# Patient Record
Sex: MALE | Race: WHITE | NOT HISPANIC OR LATINO | Employment: OTHER | URBAN - METROPOLITAN AREA
[De-identification: names, ages, dates, MRNs, and addresses within clinical notes are randomized per-mention and may not be internally consistent; named-entity substitution may affect disease eponyms.]

---

## 2018-04-09 ENCOUNTER — OFFICE VISIT (OUTPATIENT)
Dept: NEUROLOGY | Facility: CLINIC | Age: 68
End: 2018-04-09
Payer: MEDICARE

## 2018-04-09 VITALS
DIASTOLIC BLOOD PRESSURE: 66 MMHG | HEART RATE: 62 BPM | BODY MASS INDEX: 35.19 KG/M2 | WEIGHT: 218.94 LBS | SYSTOLIC BLOOD PRESSURE: 130 MMHG | HEIGHT: 66 IN

## 2018-04-09 DIAGNOSIS — G25.81 RESTLESS LEG SYNDROME, UNCONTROLLED: ICD-10-CM

## 2018-04-09 PROCEDURE — 99203 OFFICE O/P NEW LOW 30 MIN: CPT | Mod: PBBFAC | Performed by: PSYCHIATRY & NEUROLOGY

## 2018-04-09 PROCEDURE — 99204 OFFICE O/P NEW MOD 45 MIN: CPT | Mod: S$PBB,,, | Performed by: PSYCHIATRY & NEUROLOGY

## 2018-04-09 PROCEDURE — 99999 PR PBB SHADOW E&M-NEW PATIENT-LVL III: CPT | Mod: PBBFAC,,, | Performed by: PSYCHIATRY & NEUROLOGY

## 2018-04-09 RX ORDER — PREGABALIN 75 MG/1
75 CAPSULE ORAL 2 TIMES DAILY
COMMUNITY
End: 2018-05-07 | Stop reason: SDUPTHER

## 2018-04-09 RX ORDER — CLONAZEPAM 0.5 MG/1
0.5 TABLET ORAL
COMMUNITY
Start: 2018-03-07

## 2018-04-09 RX ORDER — POTASSIUM CHLORIDE 750 MG/1
10 TABLET, EXTENDED RELEASE ORAL ONCE
COMMUNITY

## 2018-04-09 RX ORDER — CEPHALEXIN 250 MG/1
250 CAPSULE ORAL 4 TIMES DAILY
COMMUNITY

## 2018-04-09 RX ORDER — VALSARTAN 160 MG/1
160 TABLET ORAL DAILY
COMMUNITY

## 2018-04-09 RX ORDER — BUDESONIDE AND FORMOTEROL FUMARATE DIHYDRATE 160; 4.5 UG/1; UG/1
2 AEROSOL RESPIRATORY (INHALATION) EVERY 12 HOURS
COMMUNITY

## 2018-04-09 RX ORDER — ROPINIROLE 1 MG/1
TABLET, FILM COATED ORAL
COMMUNITY
Start: 2018-03-26

## 2018-04-09 RX ORDER — ESCITALOPRAM OXALATE 20 MG/1
20 TABLET ORAL
COMMUNITY
Start: 2018-02-14 | End: 2018-04-09 | Stop reason: SINTOL

## 2018-04-09 RX ORDER — FUROSEMIDE 20 MG/1
20 TABLET ORAL 2 TIMES DAILY
COMMUNITY

## 2018-04-09 NOTE — PROGRESS NOTES
This is a 67-year-old right-handed patient who indicates that he has had difficulties with restless leg syndrome for a number of years.  The patient states that his symptoms are controlled to a certain extent by taking ropinirole 1 mg tablet, one half tablet up to 6 times a day averaging about every 4 hours together with Lyrica 75 mg twice a day and Klonopin 0.5 mg tablet, one half tablet at bedtime.  The patient states that during the day he is able to tolerate the discomfort as he is constantly moving and always busy.  However when he sits at the end of the day or attempts to go to sleep at night, his legs began to hurt to the point that he cannot stand to lay still and has to either move the feet or legs or get up and walk to relieve the discomfort.  Over the years, he has gradually increased his ropinirole to its current dose level.  However, he is also noted that occasionally he will be asymptomatic, remembering that it is time to take his medication, but then will have an exacerbation of his symptoms after taking the ropinirole.  He is further of the opinion that the addition of Lyrica has helped slightly during the daytime.    The patient is aware of the fact that he does have occasional episodes of overwhelming desire to sleep.  The patient's wife indicates that she has noticed this to occur also.  He is not had any other noticed side effects, specifically denying any of excessive compulsive gambling or other behaviors.    The patient is also noting that he is having some difficulty with recent memory.  The patient states that this is manifested as difficulty even in recalling names accurately.  He states that occasionally he will even forget the names of some of his children.  Patient states that he can misplace objects very easily although eventually he is able to find them.  He occasionally will have difficulty with recall of words during routine conversation.      ROS:  GENERAL: No fever, chills, or  weight loss.  SKIN: No rashes, itching or changes in color or texture of skin.  HEAD: No headaches or recent head trauma.  EYES: Visual acuity fine. No photophobia, ocular pain or diplopia.  EARS: Denies ear pain, discharge or vertigo.  NOSE: No loss of smell, no epistaxis or postnasal drip.  MOUTH & THROAT: No hoarseness or change in voice. No excessive gum bleeding.  NODES: Denies swollen glands.  CHEST: The patient reports easy dyspnea on exertion with minimal effort despite being on oxygen by nasal cannula.  He has no occasional nonproductive cough.  CARDIOVASCULAR: Denies chest pain, PND, orthopnea   ABDOMEN: Appetite fine. No weight loss. Denies diarrhea, abdominal pain, hematemesis or blood in stool.  URINARY: No flank pain, dysuria or hematuria.  PERIPHERAL VASCULAR: No claudication or cyanosis.  MUSCULOSKELETAL: No joint stiffness or swelling.   NEUROLOGIC: No history of seizures, paralysis, alteration of gait or coordination.    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  Both of his parents are .    SOCIAL HISTORY:  The patient is  and lives with his wife.  He is a former smoker.  He is retired.    PE:   VITAL SIGNS: Blood pressure 130/66, pulse 62, weight 99.3 kg, height 5 foot 6 inches, BMI 35.33  APPEARANCE: Well nourished, well developed, in no acute distress as he sits quietly in the exam room with nasal oxygen.    HEAD: Normocephalic, atraumatic.  EYES: PERRL. EOMI.  Non-icteric sclerae.    EARS: TM's intact. Light reflex normal. No retraction or perforation.    NOSE: Mucosa pink. Airway clear.  MOUTH & THROAT: No tonsillar enlargement. No pharyngeal erythema or exudate. No stridor.  NECK: Supple. No  bruits.  CHEST: Lungs clear to auscultation.  Breath sounds are somewhat distant however.  CARDIOVASCULAR: Regular rhythm without significant murmurs.  ABDOMEN: Bowel sounds normal. Not distended.  Moderate truncal obesity.  MUSCULOSKELETAL:  No bony deformity seen.  Muscle tone and muscle mass are normal in both upper and both lower extremities.  NEUROLOGIC:   Mental Status:  The patient is well oriented to person, time, place, and situation.  The patient is attentive to the environment and cooperative for the exam.  Cranial Nerves: II-XII grossly intact. Fundoscopic exam is normal.  No hemorrhage, exudate or papilledema is present. The extraocular muscles are intact in the cardinal directions of gaze.  No ptosis is present. Facial features are symmetrical.  Speech is normal in fluency, diction, and phrasing.  Tongue protrudes in the midline.    Gait and Station:  Romberg is negative.  Good alternate armswing with normal gait.  Motor:  No downdrift of either arm when held at shoulder level.  Manual muscle testing of proximal and distal muscles of both upper and lower extremities is normal.   Sensory:  Intact both upper and lower extremities to pin prick, touch, and vibration.  Cerebellar:  Finger to nose done well.  Alternating movements intact.  No involuntary movements or tremor seen.  Reflexes:  Stretch reflexes are 2+ both upper and lower extremities.  Plantar stimulation is flexor bilaterally and no pathological reflexes are seen    ASSESSMENT:  1.  Restless leg syndrome  2.  COPD  3.  Essential hypertension  4.  Obstructive sleep apnea    RECOMMENDATIONS:  1.  In this situation, care must be taken to avoid augmentation that can occur by increasing the doses of the dopamine agonist.  At the present time, I suspect he is getting maximum benefit from the ropinirole.  However a cautious trial of increasing the medication could be instituted if other measures are noneffective.  In the interval however, I would  recommend that he increase clonazepam to 0.5 mg at bedtime.  As an alternative, we could also increase Lyrica to 100 mg twice a day.  As mentioned, I would very hesitant to increase the dopamine agonist.  2.  The patient is going to try the increase clonazepam and then will notify this examiner so that we can make further recommendations in regards to medication management of his restless leg syndrome.  3.  Routine office visit in 6 months.    This was a 55 minute visit with the patient and his wife with over 50% of time spent counseling the patient regarding the management of restless leg syndrome with medication.      This note is generated with speech recognition software and is subject to transcription error and sound alike phrases that may be missed by proofreading.

## 2018-04-09 NOTE — LETTER
April 9, 2018      Roly Pizano MD  [address] [facility] [id number]           [facility]  [address] [facility]  [place] [address]  Phone: [phone]  Fax: [phone]          Patient: Marlene Lieberman Sr.   MR Number: [id number]   YOB: 1950   Date of Visit: 4/9/2018       Dear Dr. Roly Pizano:    Thank you for referring Marlene Lieberman to me for evaluation. Attached you will find relevant portions of my assessment and plan of care.    If you have questions, please do not hesitate to call me. I look forward to following Marlene Lieberman along with you.    Sincerely,    Henry Hook MD    Enclosure  CC:  No Recipients    If you would like to receive this communication electronically, please contact [email or web address] or [phone] to request more information on QuicklyChat Link access.    For providers and/or their staff who would like to refer a patient to [facility], please contact us through our one-stop-shop provider referral line, [facility], at [phone].    If you feel you have received this communication in error or would no longer like to receive these types of communications, please e-mail [email or web address]

## 2018-05-04 ENCOUNTER — PATIENT MESSAGE (OUTPATIENT)
Dept: NEUROLOGY | Facility: CLINIC | Age: 68
End: 2018-05-04

## 2018-05-07 ENCOUNTER — TELEPHONE (OUTPATIENT)
Dept: NEUROLOGY | Facility: CLINIC | Age: 68
End: 2018-05-07

## 2018-05-07 RX ORDER — PREGABALIN 75 MG/1
75 CAPSULE ORAL 2 TIMES DAILY
Qty: 60 CAPSULE | Refills: 5 | Status: SHIPPED | OUTPATIENT
Start: 2018-05-07

## 2018-05-07 NOTE — TELEPHONE ENCOUNTER
----- Message from Norma Espinoza sent at 5/7/2018  1:48 PM CDT -----  Contact: pt  Please call pt @ [phone], pt states he uses [facility]/stella Fabian/ [phone].

## 2018-08-24 ENCOUNTER — TELEPHONE (OUTPATIENT)
Dept: NEUROLOGY | Facility: CLINIC | Age: 68
End: 2018-08-24